# Patient Record
Sex: FEMALE | Race: WHITE
[De-identification: names, ages, dates, MRNs, and addresses within clinical notes are randomized per-mention and may not be internally consistent; named-entity substitution may affect disease eponyms.]

---

## 2018-12-31 ENCOUNTER — HOSPITAL ENCOUNTER (INPATIENT)
Dept: HOSPITAL 10 - E/R | Age: 38
LOS: 4 days | Discharge: HOME | DRG: 282 | End: 2019-01-04
Attending: INTERNAL MEDICINE | Admitting: INTERNAL MEDICINE
Payer: MEDICAID

## 2018-12-31 VITALS
HEIGHT: 62 IN | BODY MASS INDEX: 26.94 KG/M2 | BODY MASS INDEX: 26.94 KG/M2 | WEIGHT: 146.39 LBS | HEIGHT: 62 IN | WEIGHT: 146.39 LBS

## 2018-12-31 DIAGNOSIS — D64.9: ICD-10-CM

## 2018-12-31 DIAGNOSIS — I21.4: Primary | ICD-10-CM

## 2018-12-31 DIAGNOSIS — I95.9: ICD-10-CM

## 2018-12-31 PROCEDURE — 84439 ASSAY OF FREE THYROXINE: CPT

## 2018-12-31 PROCEDURE — 75574 CT ANGIO HRT W/3D IMAGE: CPT

## 2018-12-31 PROCEDURE — 84443 ASSAY THYROID STIM HORMONE: CPT

## 2018-12-31 PROCEDURE — 84100 ASSAY OF PHOSPHORUS: CPT

## 2018-12-31 PROCEDURE — 36415 COLL VENOUS BLD VENIPUNCTURE: CPT

## 2018-12-31 PROCEDURE — 85025 COMPLETE CBC W/AUTO DIFF WBC: CPT

## 2018-12-31 PROCEDURE — 83735 ASSAY OF MAGNESIUM: CPT

## 2018-12-31 PROCEDURE — 84703 CHORIONIC GONADOTROPIN ASSAY: CPT

## 2018-12-31 PROCEDURE — 93306 TTE W/DOPPLER COMPLETE: CPT

## 2018-12-31 PROCEDURE — 82553 CREATINE MB FRACTION: CPT

## 2018-12-31 PROCEDURE — 96375 TX/PRO/DX INJ NEW DRUG ADDON: CPT

## 2018-12-31 PROCEDURE — 84484 ASSAY OF TROPONIN QUANT: CPT

## 2018-12-31 PROCEDURE — 80061 LIPID PANEL: CPT

## 2018-12-31 PROCEDURE — 85610 PROTHROMBIN TIME: CPT

## 2018-12-31 PROCEDURE — 85730 THROMBOPLASTIN TIME PARTIAL: CPT

## 2018-12-31 PROCEDURE — 93005 ELECTROCARDIOGRAM TRACING: CPT

## 2018-12-31 PROCEDURE — 80053 COMPREHEN METABOLIC PANEL: CPT

## 2018-12-31 PROCEDURE — 83036 HEMOGLOBIN GLYCOSYLATED A1C: CPT

## 2018-12-31 PROCEDURE — 84481 FREE ASSAY (FT-3): CPT

## 2018-12-31 PROCEDURE — 80048 BASIC METABOLIC PNL TOTAL CA: CPT

## 2018-12-31 PROCEDURE — 71045 X-RAY EXAM CHEST 1 VIEW: CPT

## 2018-12-31 PROCEDURE — 96374 THER/PROPH/DIAG INJ IV PUSH: CPT

## 2018-12-31 PROCEDURE — 82550 ASSAY OF CK (CPK): CPT

## 2018-12-31 NOTE — ERD
ER Documentation


Chief Complaint


Chief Complaint





Left trapezius pain, left jaw pain onset today





HPI


This is a 38-year-old female complaining of pain to her left trapezius and 


periscapular region that is worse with movement of the left arm and left 


shoulder.  She said the onset of this pain was last night.  Denies any injury or


trauma denies any recent lifting pushing pulling.  She said she had brief left 


anterior chest pain this morning x1 but no shortness of breath.  She also 


complains of pain to the left TMJ region that she says hurts when she pushes on 


it or opens her mouth.  No trauma to this area either.  No dizziness shortness 


of breath diaphoresis





ROS


All systems reviewed and are negative except as per history of present illness.





Allergies


Allergies:  


Coded Allergies:  


     No Known Allergy (Unverified , 12/31/18)





PMhx/Soc


Medical and Surgical Hx:  pt denies Medical Hx, pt denies Surgical Hx


Hx Alcohol Use:  No


Hx Substance Use:  No


Hx Tobacco Use:  No


Smoking Status:  Never smoker





FmHx


Family History:  No coronary disease





Physical Exam


Vitals





Vital Signs


  Date      Temp  Pulse  Resp  B/P (MAP)   Pulse Ox  O2          O2 Flow    FiO2


Time                                                 Delivery    Rate


  12/31/18  98.6     70    16      114/76        99  Room Air


     20:34                           (89)


  12/31/18  98.6     68    18      129/82       100


     20:25                           (98)





Physical Exam


 Const:      Well-developed, well-nourished


 Head:        Atraumatic, normocephalic


 Eyes:       Normal Conjunctiva, PERRLA, EOMI, normal sclera, no nystagmus


 ENT:         Normal External Ears, Nose and Mouth, moist mucus membranes, 


tenderness to the left TMJ, pain is worse when opening her mouth and 


reproducible when opening her mouth.


 Neck:        Full range of motion.  No meningismus, no lymphadenopathy, there 


is reproducible tenderness to the left trapezius muscle and rhomboids, the pain 


is reproducible with movement of the left arm and left shoulder.


 Resp:         Clear to auscultation bilaterally, no wheezing, rhonchi, rales


 Cardio:       Regular rate and rhythm, no murmurs, S1 S2 present


 Abd:         Soft, non tender x 4, non distended. Normal bowel sounds, no 


guarding or rebound, no pulsitile abdominal masses or bruits


 Skin:         No petechiae or rashes, no ecchymosis , no maculopapular rash


 Back:        No midline or flank tenderness


 Ext:          No cyanosis, or edema, FROM x 4, normal inspection, 


neurovascularly intact x 4


 Neur:        Awake and alert, STR 5/5 x 4, sensation intact x 4, no focal 


findings, cerebellum intact


 Psych:        Normal Mood and Affect


Results 24 hrs





Laboratory Tests


                           Test
       12/31/18
21:29


                           Troponin I   12.700 ng/ml





Current Medications


 Medications
   Dose
          Sig/Betty
       Start Time
   Status  Last


 (Trade)       Ordered        Route
 PRN     Stop Time              Admin
Dose


                              Reason                                Admin


 Aspirin
       162 mg         ONCE  STAT
    12/31/18      DC          12/31/18


(Aspirin)                     PO
            21:01
                       21:21



                                             12/31/18


                                             21:02


                1 mg           ONCE  STAT
    12/31/18      DC          12/31/18


Hydromorphone                 IV
            21:01
                       21:31



HCl
                                         12/31/18


(Dilaudid)                                   21:02


 Ondansetron    4 mg           ONCE  STAT
    12/31/18      DC          12/31/18


HCl
  (Zofran                 IV
            21:01
                       21:31



Inj)                                         12/31/18


                                             21:02


                125 mg         ONCE  ONCE
    12/31/18      DC          12/31/18


Methylprednis                 IV
            21:30
                       21:31



olone
 Sodium                                12/31/18


Succinate
                                   21:31


(Solu-Medrol)


 Aspirin
       162 mg         ONCE  STAT
    12/31/18      DC       



(Aspirin)                     PO
            22:15



                                             12/31/18


                                             22:18


                1 inch         ONCE  STAT
    12/31/18      DC       



Nitroglycerin                 TD
            22:15




                                            12/31/18


(Nitroglyceri                                22:18


n
 2% Oint)


 Enoxaparin     70 mg          ONCE  STAT
    12/31/18      DC       



Sodium
                       SC
            22:15



(Lovenox)                                    12/31/18


                                             22:18








Procedures/MDM


EKG: 


Rate/Rhythm:             Normal sinus rhythm, Q waves in lead V1 and V2


QRS, ST, QT:    NORMAL OK, QRS, QT]


Impression:      Abnormal EKG











Patient's troponin is 12.  She is a very atypical presentation for non-STEMI.  


She has no risk factors for cardiovascular disease.





I ordered blood work, chest x-ray.  Will administer another 162 of aspirin with 


Nitropaste and Lovenox.





Will admit to panel.





Critical Care Time:     30 minutes





Treatments/Evaluations: Close monitoring and treatment of unstable vital signs, 


cardiorespiratory, and neurologic status, while maintaining tight balance of 


fluid, respiratory, and cardiac interventions. This time includes discussing the


case with the patient and the patient's family. This time does not include all 


procedures stated elsewhere in this record. This time also includes reviewing 


old records, labs and radiological studies. This time includes examining and re-


examining the patient. Additionally, this time also includes arranging care with


admitting and consulting physicians.





Departure


Diagnosis:  


   Primary Impression:  


   Non-STEMI (non-ST elevated myocardial infarction)


Condition:  Stable











GEMA FINNEY DO         Dec 31, 2018 22:10

## 2018-12-31 NOTE — HP
Date/Time of Note


Date/Time of Note


DATE: 12/31/18 


TIME: 23:55





Assessment/Plan


VTE Prophylaxis


Pharmacological prophylaxis:  heparin





Lines/Catheters


IV Catheter Type (from Nrs):  Saline Lock





Assessment/Plan


Assessment/Plan





38-year-old female with no significant past medical history being admitted for 


NSTEMI





PLAN


Admit to telemetry unit


Patient is status post therapeutic dose of Lovenox and aspirin in the ER.  We 


will continue. 


Add statin and if blood pressure allows beta-blocker.  As needed nitro


Supplemental oxygen


Trend troponin


2D echo and cardiology consult


Result Diagram:  


12/31/18 2126 12/31/18 2126





Results 24hrs





Laboratory Tests


      Test
                                12/31/18
21:26  12/31/18
21:29


      White Blood Count                             9.8


      Red Blood Count                             3.95  L


      Hemoglobin                                  11.7  L


      Hematocrit                                  34.0  L


      Mean Corpuscular Volume                      86.1


      Mean Corpuscular Hemoglobin                  29.6


      Mean Corpuscular Hemoglobin
Concent         34.4  
  



      Red Cell Distribution Width                  12.7


      Platelet Count                                228


      Mean Platelet Volume                        11.8  H


      Immature Granulocytes %                     0.400


      Neutrophils %                               80.8  H


      Lymphocytes %                               10.4  L


      Monocytes %                                   7.4


      Eosinophils %                                 0.7


      Basophils %                                   0.3


      Nucleated Red Blood Cells %                   0.0


      Immature Granulocytes #                    0.040  H


      Neutrophils #                                7.9  H


      Lymphocytes #                                 1.0


      Monocytes #                                   0.7


      Eosinophils #                                 0.1


      Basophils #                                   0.0


      Nucleated Red Blood Cells #                   0.0


      Prothrombin Time                             14.0


      Prothrombin Time Ratio                        1.1


      INR International Normalized
Ratio          1.07  
  



      Activated Partial
Thromboplast Time         28.6  
  



      Sodium Level                                  138


      Potassium Level                              3.1  L


      Chloride Level                                101


      Carbon Dioxide Level                           24


      Anion Gap                                      13


      Blood Urea Nitrogen                            14


      Creatinine                                   0.48


      Est Glomerular Filtrat Rate
mL/min   > 60  
         



      Glucose Level                                 167


      Calcium Level                                 8.8


      Total Bilirubin                               0.2


      Direct Bilirubin                             0.00


      Indirect Bilirubin                            0.2


      Aspartate Amino Transf
(AST/SGOT)           105  H
  



      Alanine Aminotransferase
(ALT/SGPT)          75  H
  



      Alkaline Phosphatase                          107


      Total Protein                                 6.1


      Albumin                                       3.4


      Globulin                                     2.70


      Albumin/Globulin Ratio                       1.25


      Troponin I                                               12.700  *H








HPI/ROS


Admit Date/Time


Admit Date/Time








Hx of Present Illness





This is a 38-year-old female with no significant past medical history who 


presents the ER complaining of chest pain, left shoulder and left arm pain.  


Chest pain is mainly left-sided, which started in the morning and has resolved 


already.  Throughout the day, she continues to have left shoulder/left upper 


back as well as left arm pain.  Denied shortness of breath, nausea/vomiting or 


diaphoresis.  He also denied trauma or strenuous activities.


When she presented to ER, initial troponin was found to be 12.  EKG shows Q 


waves as well as T wave abnormalities, but no ST elevation or depression.  


Patient was given treatment dose of Lovenox.  Vital stable and the chest x-ray 


without acute findings.


.





PMH/Family/Social


Past Medical History


Medications





Current Medications


Ondansetron HCl (Zofran Inj) 4 mg ER BRIDGE PRN IV NAUSEA AND/OR VOMITING;  


Start 12/31/18 at 22:30;  Stop 1/1/19 at 22:29


Acetaminophen (Tylenol Tab) 650 mg ER BRIDGE PRN PO MILD PAIN(1-3)OR ELEVATED 


TEMP;  Start 12/31/18 at 22:30;  Stop 1/1/19 at 22:29


Coded Allergies:  


     No Known Allergy (Unverified , 12/31/18)





Social History


Smoking Status:  Never smoker





Exam/Review of Systems


Vital Signs


Vitals





Vital Signs


  Date      Temp  Pulse  Resp  B/P (MAP)   Pulse Ox  O2          O2 Flow    FiO2


Time                                                 Delivery    Rate


  12/31/18           66    13      113/76        99  Nasal             2.0


     22:57                           (88)            Cannula


  12/31/18  98.6


     20:34








Exam


Exam


Constitutional:  other (no acute distress)


Head:  normocephalic


Respiratory:  other (slight decreased at bases)


Cardiovascular:  regular rate and rhythm


Gastrointestinal:  soft


Extremities:  normal pulses





PMH/Family/Social


Past Medical History


Medical History:  other (see hpi)


Coded Allergies:  


     No Known Drug Allergy (Verified  Allergy, Unknown, 8/18/16)





Past Surgical History


Past Surgical Hx:  other (see hpi)





Family History


Significant Family History:  no pertinent family hx





Social History


Alcohol Use:  other


Smoking Status:  Unknown if ever smoked


Drug Use:  other











ANAT MABRY MD                 Dec 31, 2018 23:55

## 2019-01-01 VITALS — SYSTOLIC BLOOD PRESSURE: 101 MMHG | RESPIRATION RATE: 18 BRPM | DIASTOLIC BLOOD PRESSURE: 57 MMHG | HEART RATE: 63 BPM

## 2019-01-01 VITALS — HEART RATE: 63 BPM | RESPIRATION RATE: 16 BRPM | DIASTOLIC BLOOD PRESSURE: 58 MMHG | SYSTOLIC BLOOD PRESSURE: 98 MMHG

## 2019-01-01 VITALS — SYSTOLIC BLOOD PRESSURE: 102 MMHG | HEART RATE: 90 BPM | RESPIRATION RATE: 17 BRPM | DIASTOLIC BLOOD PRESSURE: 55 MMHG

## 2019-01-01 VITALS — RESPIRATION RATE: 18 BRPM | HEART RATE: 58 BPM | SYSTOLIC BLOOD PRESSURE: 97 MMHG | DIASTOLIC BLOOD PRESSURE: 60 MMHG

## 2019-01-01 VITALS — HEART RATE: 62 BPM

## 2019-01-01 VITALS — HEART RATE: 87 BPM | RESPIRATION RATE: 18 BRPM | SYSTOLIC BLOOD PRESSURE: 96 MMHG | DIASTOLIC BLOOD PRESSURE: 62 MMHG

## 2019-01-01 VITALS — HEART RATE: 65 BPM

## 2019-01-01 VITALS — HEART RATE: 69 BPM | DIASTOLIC BLOOD PRESSURE: 57 MMHG | RESPIRATION RATE: 17 BRPM | SYSTOLIC BLOOD PRESSURE: 99 MMHG

## 2019-01-01 VITALS — HEART RATE: 69 BPM

## 2019-01-01 VITALS — HEART RATE: 75 BPM | DIASTOLIC BLOOD PRESSURE: 51 MMHG | RESPIRATION RATE: 17 BRPM | SYSTOLIC BLOOD PRESSURE: 95 MMHG

## 2019-01-01 VITALS — HEART RATE: 82 BPM

## 2019-01-01 RX ADMIN — FOLIC ACID SCH MLS/HR: 5 INJECTION, SOLUTION INTRAMUSCULAR; INTRAVENOUS; SUBCUTANEOUS at 09:29

## 2019-01-01 RX ADMIN — ENOXAPARIN SODIUM SCH MG: 100 INJECTION SUBCUTANEOUS at 09:13

## 2019-01-01 RX ADMIN — ENOXAPARIN SODIUM SCH MG: 100 INJECTION SUBCUTANEOUS at 20:16

## 2019-01-01 RX ADMIN — FOLIC ACID SCH MLS/HR: 5 INJECTION, SOLUTION INTRAMUSCULAR; INTRAVENOUS; SUBCUTANEOUS at 01:50

## 2019-01-01 RX ADMIN — ATORVASTATIN CALCIUM SCH MG: 80 TABLET, FILM COATED ORAL at 20:10

## 2019-01-01 RX ADMIN — FOLIC ACID SCH MLS/HR: 5 INJECTION, SOLUTION INTRAMUSCULAR; INTRAVENOUS; SUBCUTANEOUS at 12:55

## 2019-01-01 RX ADMIN — ASPIRIN 81 MG SCH MG: 81 TABLET ORAL at 09:12

## 2019-01-01 NOTE — NUR
END OF SHIFT REPORT: No complaints of chest pain all day. VSS. Patient updated with plan of 
care by Dr. Puentes and is agreeable. NPO except meds after midnight for possible procedure 
in AM.

## 2019-01-01 NOTE — NUR
NOTES

PT DENIES ANY PAIN DURING THE SHIFT.

INSTRUCTED TO CALL THE NURSE IF SHE FEEL ANY DISCOMFORT.

## 2019-01-01 NOTE — CONS
Date/Time of Note


Date/Time of Note


DATE: 1/1/19 


TIME: 13:10





Assessment/Plan


Non-ST elevation myocardial infarction


Preserved ejection fraction





-Patient with initial symptoms of fevers and chills and cough and then developed


left-sided chest pain and arm pain.  Initial troponin was 12 and has since been 


trending down.  She denies any shortness of breath or chest pain at the current 


time.  Echocardiogram with difficult acoustic windows but overall preserved 


ejection fraction, ECG with anterior Q waves.  Continue aspirin therapy, statin 


therapy, anticoagulation, will likely need coronary angiogram.  Plan of care and


findings discussed with the patient and  at bedside via .





-Dr Suárez to resume care 1/2/2019


Result Diagram:  


1/1/19 0549                                                                     


          1/1/19 0549





Results 24hrs





Laboratory Tests


Test
                 12/31/18
21:26  12/31/18
21:29  1/1/19
00:43  1/1/19
05:49


White Blood Count              9.8                                         8.1


Red Blood Count              3.95  L                                     4.14  L


Hemoglobin                   11.7  L                                      12.4


Hematocrit                   34.0  L                                     35.6  L


Mean Corpuscular              86.1                                        86.0


Volume


Mean Corpuscular              29.6                                        30.0


Hemoglobin


Mean Corpuscular             34.4  
  
               
                  34.8  



Hemoglobin
Concent


Red Cell                      12.7                                        12.6


Distribution Width


Platelet Count                 228                                         263


Mean Platelet Volume         11.8  H                                     11.4  H


Immature                     0.400                                      0.600  H


Granulocytes %


Neutrophils %                80.8  H


Lymphocytes %                10.4  L


Monocytes %                    7.4


Eosinophils %                  0.7


Basophils %                    0.3


Nucleated Red Blood            0.0                                         0.0


Cells %


Immature                    0.040  H                                    0.050  H


Granulocytes #


Neutrophils #                 7.9  H


Lymphocytes #                  1.0


Monocytes #                    0.7


Eosinophils #                  0.1


Basophils #                    0.0


Nucleated Red Blood            0.0


Cells #


Prothrombin Time              14.0


Prothrombin Time               1.1


Ratio


INR International            1.07  
  
               
             



Normalized
Ratio


Activated                    28.6  
  
               
             



Partial
Thromboplast


Time


Sodium Level                   138                                         140


Potassium Level               3.1  L                                       4.0


Chloride Level                 101                                         102


Carbon Dioxide Level            24                                          26


Anion Gap                       13                                          12


Blood Urea Nitrogen             14                                           9


Creatinine                    0.48                                       0.41  L


Est Glomerular        > 60  
         
               
             > 60  



Filtrat Rate
mL/min


Glucose Level                  167                                         194


Calcium Level                  8.8                                         9.3


Total Bilirubin                0.2                                         0.3


Direct Bilirubin              0.00                                        0.00


Indirect Bilirubin             0.2                                         0.3


Aspartate Amino              105  H
  
               
                  106  H



Transf
(AST/SGOT)


Alanine                       75  H
  
               
                    68  



Aminotransferase
(AL


T/SGPT)


Alkaline Phosphatase           107                                         107


Total Protein                  6.1                                        7.1  #


Albumin                        3.4                                         3.9


Globulin                      2.70                                        3.20


Albumin/Globulin              1.25                                        1.21


Ratio


Troponin I                                12.700  *H    10.300  *H     5.960  *H


Creatine Kinase                                             735  H        511  H


Creatine Kinase                                              8.7           9.6


Index


Creatinine Kinase MB                                      64.00  H      49.10  H


(Mass)


Segmented             
               
               
                   90  H



Neutrophils


%
(Manual)


Band Neutrophils %                                                           1


(Manual)


Lymphocytes %                                                               4  L


(Manual)


Reactive Lymphocytes  
               
               
                    3  H



%
(Manual)


Monocytes % (Manual)                                                         1


Plasma Cells %                                                               1


(manual)


Neutrophils #                                                              7.3


(Manual)


Band Neutrophils #                                                         0.0


Lymphocytes (Manual)                                                      0.3  L


Reactive Lymphocytes                                                      0.2  H


#


Monocytes # (Manual)                                                      0.0  L


Plasma Cells #                                                             0.0


(manual)


Platelet Estimate                                                   NORMAL


Giant Platelets                                                             2  H


Platelet Morphology                                                 @See below


Comment


Anisocytosis                                                                1+


Hemoglobin A1c                                                             5.5


Magnesium Level                                                            2.1


Triglycerides Level                                                         81


Cholesterol Level                                                          142


LDL Cholesterol,                                                            80


Calculated


HDL Cholesterol                                                             46


Cholesterol/HDL                                                            3.0


Ratio


Thyroid Stimulating   
               
               
                0.113  L



Hormone
(TSH)








Consultation Date/Type/Reason


Admit Date/Time





Type of Consult


cv


Reason for Consultation


Elevated troponin





Hx of Present Illness


This is a 38-year-old female with no significant past medical history was having


symptoms of fevers, chills and cough proxy 2-3 days, then approximately 2-3 days


ago, developed left-sided chest pain and arm pain.  Patient could not describe 


the pain that she thought it felt musculoskeletal.  She denies any chest 


pressure.  Denies any shortness of breath.  Symptoms were getting severe and 


slowly improved.  Yesterday, patient with continued chest and arm pain because 


of the above, came to the emergency room for evaluation and care.  She denies 


any further chest pain, arm pain since yesterday evening.  She denies any 


shortness of breath.  She complains of mild headache after nitroglycerin.


12 point review of systems was performed with all pertinent positives and 


negatives mentioned above and all else is negative





Past Medical History


Medical History:  no pertinent history


Medications





Current Medications


Dextrose/Sodium Chloride 1,000 ml @  100 mls/hr Q10H IV  Last administered on 


1/1/19at 12:55; Admin Dose 100 MLS/HR;  Start 1/1/19 at 00:00


IV Flush (NS 3 ml) 3 ml PER PROTOCOL IV ;  Start 1/1/19 at 00:00


Ondansetron HCl (Zofran Inj) 4 mg Q6H  PRN IV NAUSEA AND/OR VOMITING;  Start 


1/1/19 at 00:00


Aspirin (Aspirin) 81 mg DAILY PO  Last administered on 1/1/19at 09:12; Admin 


Dose 81 MG;  Start 1/1/19 at 09:00


Nitroglycerin (Nitroglycerin (Sl Tab) 0.4 Mg) 1 tab Q5M  PRN SL CHEST PAIN;  


Start 1/1/19 at 00:00


Acetaminophen (Tylenol Tab) 650 mg Q6H  PRN PO PAIN LEVEL 1-3 OR FEVER;  Start 


1/1/19 at 00:00


Albuterol/ Ipratropium (Duoneb) 3 ml Q2H RESP THERAPY  PRN HHN SHORTNESS OF 


BREATH;  Start 1/1/19 at 00:00


Enoxaparin Sodium (Lovenox) 65 mg Q12 SC  Last administered on 1/1/19at 09:13; 


Admin Dose 65 MG;  Start 1/1/19 at 09:00


Atorvastatin Calcium (Lipitor) 80 mg HS PO ;  Start 1/1/19 at 21:00


Morphine Sulfate (morphine) 2 mg Q4H  PRN IV SEVERE PAIN LEVEL 7-10;  Start 


1/1/19 at 11:30


Allergies:  


Coded Allergies:  


     No Known Allergy (Unverified , 12/31/18)





Past Surgical History


Past Surgical Hx:  no surgical history





Family History


Significant Family History:  no pertinent family hx





Social History


Alcohol Use:  none


Smoking Status:  Former smoker


Other Social History


Works as a cook in a restaurant





Exam/Review of Systems


Vital Signs


Vitals





Vital Signs


  Date      Temp  Pulse  Resp  B/P (MAP)   Pulse Ox  O2          O2 Flow    FiO2


Time                                                 Delivery    Rate


    1/1/19           65


     13:07


    1/1/19  98.2           17  99/57 (71)        97  Nasal


     11:37                                           Cannula


    1/1/19                                                             2.0


     04:00








Intake and Output





12/31/18 12/31/18 1/1/19





1515:00


23:00


07:00





IntakeIntake Total


620 ml





BalanceBalance


620 ml














Exam


No apparent distress


Constitutional:  alert, oriented, well developed


Head:  normocephalic


Respiratory:  clear to auscultation, normal air movement


Cardiovascular:  regular rate and rhythm, other (S1-S2 heard)


Gastrointestinal:  soft, non-tender, bowel sounds


Extremities:  other (No significant edema)





Medications


Medications





Current Medications


Dextrose/Sodium Chloride 1,000 ml @  100 mls/hr Q10H IV  Last administered on 


1/1/19at 12:55; Admin Dose 100 MLS/HR;  Start 1/1/19 at 00:00


IV Flush (NS 3 ml) 3 ml PER PROTOCOL IV ;  Start 1/1/19 at 00:00


Ondansetron HCl (Zofran Inj) 4 mg Q6H  PRN IV NAUSEA AND/OR VOMITING;  Start 


1/1/19 at 00:00


Aspirin (Aspirin) 81 mg DAILY PO  Last administered on 1/1/19at 09:12; Admin Do


se 81 MG;  Start 1/1/19 at 09:00


Nitroglycerin (Nitroglycerin (Sl Tab) 0.4 Mg) 1 tab Q5M  PRN SL CHEST PAIN;  


Start 1/1/19 at 00:00


Acetaminophen (Tylenol Tab) 650 mg Q6H  PRN PO PAIN LEVEL 1-3 OR FEVER;  Start 


1/1/19 at 00:00


Albuterol/ Ipratropium (Duoneb) 3 ml Q2H RESP THERAPY  PRN HHN SHORTNESS OF 


BREATH;  Start 1/1/19 at 00:00


Enoxaparin Sodium (Lovenox) 65 mg Q12 SC  Last administered on 1/1/19at 09:13; 


Admin Dose 65 MG;  Start 1/1/19 at 09:00


Atorvastatin Calcium (Lipitor) 80 mg HS PO ;  Start 1/1/19 at 21:00


Morphine Sulfate (morphine) 2 mg Q4H  PRN IV SEVERE PAIN LEVEL 7-10;  Start 1/ 1/19 at 11:30





Imaging


Imaging


ECG with sinus rhythm, anterior Q waves, nonspecific ST abnormalities











Edwin Puentes DO            Jan 1, 2019 13:15

## 2019-01-01 NOTE — NUR
NOTES

RESTING ON BED WITH HEPLOCK INTACT.

INSTRUCTED TO KEEP NPO FROM MIDNIGHT FOR POSSIBLE PROCEDURE IN AM.

## 2019-01-01 NOTE — RADRPT
Echocardiogram Report

 

Patient Name:  KELVIN BRUCE  Gender:       Female

MRN:           [id number]                    Accession #:  [id number]

Birth Date:    1980                Study Date:   01-Jan-2019

Sonographer:   Jean PEREZ RDCS            Location:     [facility]

 

Ref. Physician: ANAT MABRY

Quality: Adequate

 

Procedures: Transthoracic echocardiogram with complete 2D, M-Mode, and 

doppler examination.

Indications: NSTEMI.

 

2D/M Mode                          Doppler

Measurement  Value  Normal Ranges  Measurement    Value  Normal Ranges

LVIDd 2D     4.0    3.5 - 5.6 cm   AV [place]    1.3    m/sec

LVIDs 2D     2.9    2.1 - 4.1 cm   AV Peak [facility]     7.0    mmHg

FS 2D        28.1   %              LVOT [place]  0.8    m/sec

LVPWd 2D     1.0    0.6 - 1.1 cm   LVOT [facility]   2.0    mmHg

IVSd 2D      0.9    0.6 - 1.1 cm   MV E [place]  0.9    m/sec

IVS/LVPW 2D  0.9                   MV A [place]  0.9    m/sec

AoR Diam 2D  2.3    2.0 - 3.7 cm   MV E/A         1.0

LA/Ao 2D     1      0 - 1          MV Decel Time  169    msec

EDV 2D       65.0   cm3            MV E/A         1.0

ESV 2D       24.1   cm3            TV E [place]  0.7    m/sec

LA Dimen 2D  3.0    2.3 - 4.0 cm

 

Findings

Left Ventricle: Overall, normal left ventricular systolic function. Not 

all segments visualized.  Normal left ventricular cavity size.  Normal 

left ventricular wall thickness.  Ejection fraction is visually 

estimated at 60 %.

Right Ventricle: Normal right ventricular size.  Normal right 

ventricular systolic function.

Left Atrium: The left atrium is normal in size.

Right Atrium: The right atrium is normal in size.

Mitral Valve: Normal appearance of the mitral valve.  No mitral valve 

regurgitation is seen.

Aortic Valve: Normal appearance of the aortic valve.  No significant 

aortic stenosis or insufficiency.

Tricuspid Valve: Normal appearance of the tricuspid valve.  There is 

trace tricuspid regurgitation.

Pulmonic Valve: Normal pulmonic valve appearance.  No evidence of 

pulmonic regurgitation.

Pericardium: Normal pericardium with no significant pericardial effusion.

Aorta: Normal aortic root.

IVC: Normal size and normal respiratory collapse consistent with normal 

right atrial pressure.

 

Conclusions

Overall, normal left ventricular systolic function. Not all segments 

visualized.  Normal left ventricular cavity size.  Normal left 

ventricular wall thickness.  Ejection fraction is visually estimated at 

60 %.

Normal right ventricular size.  Normal right ventricular systolic 

function.

The left atrium is normal in size.

The right atrium is normal in size.

No significant valvular stenosis or regurgitation seen.

Normal pericardium with no significant pericardial effusion.

 

Electronically Signed By:

Edwin Puentes

01-Jan-2019 12:11:19  -0800

 

Patient Name: KELVIN BRUCE

MRN: [id number]

Study Date: 01-Jan-2019 [id number]

## 2019-01-01 NOTE — NUR
NOTES

ADMISSION UNDER THE CARE OF DR MABRY. ADMISSION ORDER NOTED AND CARRIED OUT.

INITIAL ASSESSMENT DONE.

## 2019-01-01 NOTE — PN
Date/Time of Note


Date/Time of Note


DATE: 1/1/19 


TIME: 11:23





Assessment/Plan


VTE Prophylaxis


Risk score (from Ns)>0 risk:  2


SCD applied (from Nsg):  Yes


Pharmacological prophylaxis:  LMWH





Lines/Catheters


IV Catheter Type (from Nrsg):  Peripheral IV


Urinary Cath still in place:  No





Assessment/Plan


Hospital Course





SUBJECTIVE: Lying in bed comfortably.  Denies any chest pain, palpitation, 


shortness of breath or other discomfort at this time.








OBJECTIVE: Vital signs-see below








PHYSICAL EXAM:


Constitutional: Well-developed, adequately built, lying in bed comfortably.


Psych:  nl mood/affect, no complaints


Head:  atraumatic, normocephalic


Eyes:  nl conjunctiva, nl sclera


ENMT:  mucosa pink and moist, nl external ears & nose


Neck:  non-tender, supple


Respiratory:  clear to auscultation, normal air movement


Cardiovascular:  nl pulses, regular rate and rhythm


Gastrointestinal:  non-tender, soft, bowel sounds active in all 4 quadrants.


Musculoskeletal/extremities:  nl extremities to inspection, motor strength equal


bilaterally, no focal deficit. Normal pulses,no cyanosis, no edema.


Neurological: Alert oriented 3,nl speech, nl strength


Skin:  nl turgor





ASSESSMENT/PLAN:38-year-old female with no past medical or social history, 


admitted with 2-day duration of left-sided chest pain radiating to left 


shoulder, neck, and LUE, found to have elevated troponin with EKG changes.





1.  Non-ST elevated myocardial infarction.


-Pending Cardiology consult.  As per sarah, left message to Dr. Suárez.  I 


spoke with Dr. Suárez and we will consult  who is on call today.


-Continue aspirin, full dose anticoagulation, increase statin to high intensity.


-PRN nitroglycerin, morphine, oxygen if indicated.


-Follow-up 2D echocardiogram


-Initial EKG with presence of Q waves.  I will repeat her twelve-lead EKG now.  





DVT prophylaxis: Full dose Lovenox


PUD prophylaxis: Not indicated


Diet: Recommend keeping patient n.p.o. for possible cardiac intervention today.





Disposition: Continue current medical management and follow-up cardiology 


recommendations.





Patient was seen in collaboration with .


Result Diagram:  


1/1/19 0549                                                                     


          1/1/19 0549





Results 24hrs





Laboratory Tests


Test
                 12/31/18
21:26  12/31/18
21:29  1/1/19
00:43  1/1/19
05:49


White Blood Count              9.8                                         8.1


Red Blood Count              3.95  L                                     4.14  L


Hemoglobin                   11.7  L                                      12.4


Hematocrit                   34.0  L                                     35.6  L


Mean Corpuscular              86.1                                        86.0


Volume


Mean Corpuscular              29.6                                        30.0


Hemoglobin


Mean Corpuscular             34.4  
  
               
                  34.8  



Hemoglobin
Concent


Red Cell                      12.7                                        12.6


Distribution Width


Platelet Count                 228                                         263


Mean Platelet Volume         11.8  H                                     11.4  H


Immature                     0.400                                      0.600  H


Granulocytes %


Neutrophils %                80.8  H


Lymphocytes %                10.4  L


Monocytes %                    7.4


Eosinophils %                  0.7


Basophils %                    0.3


Nucleated Red Blood            0.0                                         0.0


Cells %


Immature                    0.040  H                                    0.050  H


Granulocytes #


Neutrophils #                 7.9  H


Lymphocytes #                  1.0


Monocytes #                    0.7


Eosinophils #                  0.1


Basophils #                    0.0


Nucleated Red Blood            0.0


Cells #


Prothrombin Time              14.0


Prothrombin Time               1.1


Ratio


INR International            1.07  
  
               
             



Normalized
Ratio


Activated                    28.6  
  
               
             



Partial
Thromboplast


Time


Sodium Level                   138                                         140


Potassium Level               3.1  L                                       4.0


Chloride Level                 101                                         102


Carbon Dioxide Level            24                                          26


Anion Gap                       13                                          12


Blood Urea Nitrogen             14                                           9


Creatinine                    0.48                                       0.41  L


Est Glomerular        > 60  
         
               
             > 60  



Filtrat Rate
mL/min


Glucose Level                  167                                         194


Calcium Level                  8.8                                         9.3


Total Bilirubin                0.2                                         0.3


Direct Bilirubin              0.00                                        0.00


Indirect Bilirubin             0.2                                         0.3


Aspartate Amino              105  H
  
               
                  106  H



Transf
(AST/SGOT)


Alanine                       75  H
  
               
                    68  



Aminotransferase
(AL


T/SGPT)


Alkaline Phosphatase           107                                         107


Total Protein                  6.1                                        7.1  #


Albumin                        3.4                                         3.9


Globulin                      2.70                                        3.20


Albumin/Globulin              1.25                                        1.21


Ratio


Troponin I                                12.700  *H    10.300  *H     5.960  *H


Creatine Kinase                                             735  H        511  H


Creatine Kinase                                              8.7           9.6


Index


Creatinine Kinase MB                                      64.00  H      49.10  H


(Mass)


Segmented             
               
               
                   90  H



Neutrophils


%
(Manual)


Band Neutrophils %                                                           1


(Manual)


Lymphocytes %                                                               4  L


(Manual)


Reactive Lymphocytes  
               
               
                    3  H



%
(Manual)


Monocytes % (Manual)                                                         1


Plasma Cells %                                                               1


(manual)


Neutrophils #                                                              7.3


(Manual)


Band Neutrophils #                                                         0.0


Lymphocytes (Manual)                                                      0.3  L


Reactive Lymphocytes                                                      0.2  H


#


Monocytes # (Manual)                                                      0.0  L


Plasma Cells #                                                             0.0


(manual)


Platelet Estimate                                                   NORMAL


Giant Platelets                                                             2  H


Platelet Morphology                                                 @See below


Comment


Anisocytosis                                                                1+


Hemoglobin A1c                                                             5.5


Magnesium Level                                                            2.1


Triglycerides Level                                                         81


Cholesterol Level                                                          142


LDL Cholesterol,                                                            80


Calculated


HDL Cholesterol                                                             46


Cholesterol/HDL                                                            3.0


Ratio


Thyroid Stimulating   
               
               
                0.113  L



Hormone
(TSH)








Exam/Review of Systems


Vital Signs


Vitals





Vital Signs


  Date      Temp  Pulse  Resp  B/P (MAP)   Pulse Ox  O2          O2 Flow    FiO2


Time                                                 Delivery    Rate


    1/1/19           62


     09:12


    1/1/19  98.0           16  98/58 (71)        99  Room Air


     07:21


    1/1/19                                                             2.0


     04:00








Intake and Output





12/31/18 12/31/18 1/1/19





1515:00


23:00


07:00





IntakeIntake Total


620 ml





BalanceBalance


620 ml














Medications


Medications





Current Medications


Ondansetron HCl (Zofran Inj) 4 mg ER BRIDGE PRN IV NAUSEA AND/OR VOMITING;  


Start 12/31/18 at 22:30;  Stop 1/1/19 at 22:29


Acetaminophen (Tylenol Tab) 650 mg ER BRIDGE PRN PO MILD PAIN(1-3)OR ELEVATED 


TEMP;  Start 12/31/18 at 22:30;  Stop 1/1/19 at 22:29


Dextrose/Sodium Chloride 1,000 ml @  100 mls/hr Q10H IV  Last administered on 


1/1/19at 01:50; Admin Dose 100 MLS/HR;  Start 1/1/19 at 00:00


IV Flush (NS 3 ml) 3 ml PER PROTOCOL IV ;  Start 1/1/19 at 00:00


Ondansetron HCl (Zofran Inj) 4 mg Q6H  PRN IV NAUSEA AND/OR VOMITING;  Start 


1/1/19 at 00:00


Aspirin (Aspirin) 81 mg DAILY PO  Last administered on 1/1/19at 09:12; Admin 


Dose 81 MG;  Start 1/1/19 at 09:00


Nitroglycerin (Nitroglycerin (Sl Tab) 0.4 Mg) 1 tab Q5M  PRN SL CHEST PAIN;  


Start 1/1/19 at 00:00


Acetaminophen (Tylenol Tab) 650 mg Q6H  PRN PO PAIN LEVEL 1-3 OR FEVER;  Start 


1/1/19 at 00:00


Acetaminophen/ Hydrocodone Bitart (Norco (5/325)) 1 tab Q6H  PRN PO PAIN LEVEL 


4-6;  Start 1/1/19 at 00:00


Acetaminophen/ Hydrocodone Bitart (Norco (5/325)) 2 tab Q6H  PRN PO PAIN LEVEL 


7-10;  Start 1/1/19 at 00:00


Albuterol/ Ipratropium (Duoneb) 3 ml Q2H RESP THERAPY  PRN HHN SHORTNESS OF BR


EATH;  Start 1/1/19 at 00:00


Enoxaparin Sodium (Lovenox) 65 mg Q12 SC  Last administered on 1/1/19at 09:13; 


Admin Dose 65 MG;  Start 1/1/19 at 09:00


Atorvastatin Calcium (Lipitor) 20 mg HS PO ;  Start 1/1/19 at 21:00











KENNA ROMO NP                Jan 1, 2019 11:24

## 2019-01-02 VITALS — RESPIRATION RATE: 16 BRPM | DIASTOLIC BLOOD PRESSURE: 54 MMHG | SYSTOLIC BLOOD PRESSURE: 97 MMHG | HEART RATE: 76 BPM

## 2019-01-02 VITALS — DIASTOLIC BLOOD PRESSURE: 62 MMHG | HEART RATE: 73 BPM | RESPIRATION RATE: 18 BRPM | SYSTOLIC BLOOD PRESSURE: 103 MMHG

## 2019-01-02 VITALS — HEART RATE: 69 BPM | RESPIRATION RATE: 19 BRPM | SYSTOLIC BLOOD PRESSURE: 105 MMHG | DIASTOLIC BLOOD PRESSURE: 57 MMHG

## 2019-01-02 VITALS — SYSTOLIC BLOOD PRESSURE: 102 MMHG | RESPIRATION RATE: 18 BRPM | HEART RATE: 69 BPM | DIASTOLIC BLOOD PRESSURE: 61 MMHG

## 2019-01-02 VITALS — HEART RATE: 74 BPM

## 2019-01-02 VITALS — HEART RATE: 71 BPM

## 2019-01-02 VITALS — HEART RATE: 76 BPM | SYSTOLIC BLOOD PRESSURE: 101 MMHG | DIASTOLIC BLOOD PRESSURE: 57 MMHG | RESPIRATION RATE: 18 BRPM

## 2019-01-02 VITALS — HEART RATE: 69 BPM

## 2019-01-02 VITALS — HEART RATE: 76 BPM

## 2019-01-02 RX ADMIN — METOPROLOL TARTRATE SCH MG: 25 TABLET, FILM COATED ORAL at 20:34

## 2019-01-02 RX ADMIN — METOPROLOL TARTRATE SCH MG: 25 TABLET, FILM COATED ORAL at 14:45

## 2019-01-02 RX ADMIN — ATORVASTATIN CALCIUM SCH MG: 80 TABLET, FILM COATED ORAL at 20:32

## 2019-01-02 RX ADMIN — ASPIRIN 81 MG SCH MG: 81 TABLET ORAL at 09:09

## 2019-01-02 NOTE — CONS
Date/Time of Note


Date/Time of Note


DATE: 1/2/19 


TIME: 07:50





Consult Date/Type/Reason


Admit Date/Time


Dec 31, 2018 at 22:26


Initial Consult Date





Type of Consultation:  cv





Subjective


      cv follow up note





S


D/W staff and physicians


tele was reviewed. 


pt with no more chest pain or pressure or palpitations now


no bleeding 





O:


General: no acute distress


HEENT: NC/AT. pupils are equal. round. 


NECK: NO JVD. no stridor. 


CV: RRR. systolic murmur; no gallop or rubs. 


PULM: no wheezing or rhonchi. 


GI: SOFT, NT, ND, no rebound or guarding 


Extremity: trace B/L LE edema. no clubbing. 


neuro: awake and alert, OX3. 


Psych: calm and pleasant 


rectal: deferred 








echo shows normal EF and no effusion





Objective





Vital Signs


  Date      Temp  Pulse  Resp  B/P (MAP)   Pulse Ox  O2          O2 Flow    FiO2


Time                                                 Delivery    Rate


    1/2/19  98.1     69    18      102/61        98


     07:20                           (75)


    1/1/19                                           Nasal             2.0


     20:00                                           Cannula








Intake and Output





1/1/19 1/1/19 1/2/19





1515:00


23:00


07:00





IntakeIntake Total


1070 ml





BalanceBalance


1070 ml














Results/Medications


Result Diagram:  


1/2/19 0537                                                                     


          1/2/19 0537





Results 24 hrs





Laboratory Tests


               Test
                                1/2/19
05:37


               White Blood Count                        11.0  #H


               Red Blood Count                           3.77  L


               Hemoglobin                                11.1  L


               Hematocrit                                32.6  L


               Mean Corpuscular Volume                    86.5


               Mean Corpuscular Hemoglobin                29.4


               Mean Corpuscular Hemoglobin
Concent       34.0  



               Red Cell Distribution Width                12.9


               Platelet Count                              254


               Mean Platelet Volume                      11.7  H


               Immature Granulocytes %                  0.500  H


               Neutrophils %                              72.1


               Lymphocytes %                              19.4


               Monocytes %                                 7.4


               Eosinophils %                               0.2


               Basophils %                                 0.4


               Nucleated Red Blood Cells %                 0.0


               Immature Granulocytes #                  0.060  H


               Neutrophils #                              7.9  H


               Lymphocytes #                               2.1


               Monocytes #                                 0.8


               Eosinophils #                               0.0


               Basophils #                                 0.0


               Nucleated Red Blood Cells #                 0.0


               Sodium Level                                143


               Potassium Level                             3.7


               Chloride Level                              103


               Carbon Dioxide Level                         30


               Anion Gap                                    10


               Blood Urea Nitrogen                          11


               Creatinine                                 0.56


               Est Glomerular Filtrat Rate
mL/min   > 60  



               Glucose Level                              110  #


               Calcium Level                               8.9


               Phosphorus Level                            3.5


               Magnesium Level                             1.8





Medications





Current Medications


IV Flush (NS 3 ml) 3 ml PER PROTOCOL IV ;  Start 1/1/19 at 00:00


Ondansetron HCl (Zofran Inj) 4 mg Q6H  PRN IV NAUSEA AND/OR VOMITING;  Start 


1/1/19 at 00:00


Aspirin (Aspirin) 81 mg DAILY PO  Last administered on 1/1/19at 09:12; Admin 


Dose 81 MG;  Start 1/1/19 at 09:00


Nitroglycerin (Nitroglycerin (Sl Tab) 0.4 Mg) 1 tab Q5M  PRN SL CHEST PAIN;  


Start 1/1/19 at 00:00


Acetaminophen (Tylenol Tab) 650 mg Q6H  PRN PO PAIN LEVEL 1-3 OR FEVER;  Start 


1/1/19 at 00:00


Albuterol/ Ipratropium (Duoneb) 3 ml Q2H RESP THERAPY  PRN HHN SHORTNESS OF 


BREATH;  Start 1/1/19 at 00:00


Enoxaparin Sodium (Lovenox) 65 mg Q12 SC  Last administered on 1/1/19at 20:16; 


Admin Dose 65 MG;  Start 1/1/19 at 09:00


Atorvastatin Calcium (Lipitor) 80 mg HS PO  Last administered on 1/1/19at 20:10;


Admin Dose 80 MG;  Start 1/1/19 at 21:00


Morphine Sulfate (morphine) 2 mg Q4H  PRN IV SEVERE PAIN LEVEL 7-10;  Start 


1/1/19 at 11:30





Assessment/Plan


Chief Complaint/Hosp Course


chest pain and abnormal trop: c/w NSTEMI


chest pain 


anemia 


hypotension: 


 


Rec;


dc lovenox


cont ASA


LHC french possible PCI TODAY 


CONT LIPITOR 


too hypotensive to tolerate betablocker








Thank you





ASH PATINO,ASH HER                 Jan 2, 2019 07:54

## 2019-01-02 NOTE — PN
Date/Time of Note


Date/Time of Note


DATE: 1/2/19 


TIME: 11:18





Assessment/Plan


VTE Prophylaxis


Risk score (from Ns)>0 risk:  2


SCD applied (from Ns):  Yes


SCD contraindicated:  low risk/ambulating


Pharmacological prophylaxis:  NA/contraindicated


Pharm contraindication:  low risk/ambulating





Lines/Catheters


IV Catheter Type (from [facility]):  Saline Lock


Urinary Cath still in place:  No





Assessment/Plan


Hospital Course





SUBJECTIVE: No acute overnight episodes.  No chest pain.








OBJECTIVE: Vital signs-see below








PHYSICAL EXAM:


Constitutional: Well-developed, adequately built, lying in bed comfortably.


Psych:  nl mood/affect, no complaints


Head:  atraumatic, normocephalic


Eyes:  nl conjunctiva, nl sclera


ENMT:  mucosa pink and moist, nl external ears & nose


Neck:  non-tender, supple


Respiratory:  clear to auscultation, normal air movement


Cardiovascular:  nl pulses, regular rate and rhythm


Gastrointestinal:  non-tender, soft, bowel sounds active in all 4 quadrants.


Musculoskeletal/extremities:  nl extremities to inspection, motor strength equal


bilaterally, no focal deficit. Normal pulses,no cyanosis, no edema.


Neurological: Alert oriented 3,nl speech, nl strength


Skin:  nl turgor





ASSESSMENT/PLAN:38-year-old female with no past medical or social history, 


admitted with 2-day duration of left-sided chest pain radiating to left shoulder


, neck, and LUE, found to have elevated troponin with EKG changes.





1.  Non-ST elevated myocardial infarction.


-Plan is [facility], tentatively scheduled for tomorrow.


-Continue aspirin, high intensity statin.  No indication for ATC per cardiology.





2.  Low TSH, assess significance.


-Add a full thyroid panel.





DVT prophylaxis: SCDs


PUD prophylaxis: Not indicated


Diet: Low-cholesterol/low-fat diet.





Disposition: Continue current medical management and follow-up cardiology 


recommendations.





Patient was seen in collaboration with 


Result Diagram:  


1/2/19 0537                                                                     


          1/2/19 0537





Results 24hrs





Laboratory Tests


        Test
                                1/2/19
05:36  1/2/19
05:37


        Serum HCG, Qualitative               NEGATIVE


        White Blood Count                                      11.0  #H


        Red Blood Count                                         3.77  L


        Hemoglobin                                              11.1  L


        Hematocrit                                              32.6  L


        Mean Corpuscular Volume                                  86.5


        Mean Corpuscular Hemoglobin                              29.4


        Mean Corpuscular Hemoglobin
Concent  
                  34.0  



        Red Cell Distribution Width                              12.9


        Platelet Count                                            254


        Mean Platelet Volume                                    11.7  H


        Immature Granulocytes %                                0.500  H


        Neutrophils %                                            72.1


        Lymphocytes %                                            19.4


        Monocytes %                                               7.4


        Eosinophils %                                             0.2


        Basophils %                                               0.4


        Nucleated Red Blood Cells %                               0.0


        Immature Granulocytes #                                0.060  H


        Neutrophils #                                            7.9  H


        Lymphocytes #                                             2.1


        Monocytes #                                               0.8


        Eosinophils #                                             0.0


        Basophils #                                               0.0


        Nucleated Red Blood Cells #                               0.0


        Sodium Level                                              143


        Potassium Level                                           3.7


        Chloride Level                                            103


        Carbon Dioxide Level                                       30


        Anion Gap                                                  10


        Blood Urea Nitrogen                                        11


        Creatinine                                               0.56


        Est Glomerular Filtrat Rate
mL/min   
             > 60  



        Glucose Level                                            110  #


        Calcium Level                                             8.9


        Phosphorus Level                                          3.5


        Magnesium Level                                           1.8








Exam/Review of Systems


Vital Signs


Vitals





Vital Signs


  Date      Temp  Pulse  Resp  B/P (MAP)   Pulse Ox  O2          O2 Flow    FiO2


Time                                                 Delivery    Rate


    1/2/19           74


     08:58


    1/2/19  98.1           18      102/61        98


     07:20                           (75)


    1/1/19                                           Nasal             2.0


     20:00                                           Cannula








Intake and Output





1/1/19 1/1/19 1/2/19





1515:00


23:00


07:00





IntakeIntake Total


1070 ml





BalanceBalance


1070 ml














Medications


Medications





Current Medications


IV Flush (NS 3 ml) 3 ml PER PROTOCOL IV ;  Start 1/1/19 at 00:00


Ondansetron HCl (Zofran Inj) 4 mg Q6H  PRN IV NAUSEA AND/OR VOMITING;  Start 


1/1/19 at 00:00


Aspirin (Aspirin) 81 mg DAILY PO  Last administered on 1/2/19at 09:09; Admin 


Dose 81 MG;  Start 1/1/19 at 09:00


Nitroglycerin (Nitroglycerin (Sl Tab) 0.4 Mg) 1 tab Q5M  PRN SL CHEST PAIN;  


Start 1/1/19 at 00:00


Acetaminophen (Tylenol Tab) 650 mg Q6H  PRN PO PAIN LEVEL 1-3 OR FEVER;  Start 


1/1/19 at 00:00


Albuterol/ Ipratropium (Duoneb) 3 ml Q2H RESP THERAPY  PRN HHN SHORTNESS OF 


BREATH;  Start 1/1/19 at 00:00


Atorvastatin Calcium (Lipitor) 80 mg HS PO  Last administered on 1/1/19at 20:10;


Admin Dose 80 MG;  Start 1/1/19 at 21:00


Morphine Sulfate (morphine) 2 mg Q4H  PRN IV SEVERE PAIN LEVEL 7-10;  Start 


1/1/19 at 11:30











KENNA ROMO NP                Jan 2, 2019 11:28

## 2019-01-02 NOTE — NUR
EOSS;PT AWAKE,ALERT ORIENTED X4;HEMODYNAMICS STABLE;MONITOR INDICATE NSR;NO C/O 
PAIN/DISTRESS;HOURLY ROUNDING;CON'T POC

## 2019-01-03 VITALS — RESPIRATION RATE: 18 BRPM | SYSTOLIC BLOOD PRESSURE: 101 MMHG | HEART RATE: 57 BPM | DIASTOLIC BLOOD PRESSURE: 63 MMHG

## 2019-01-03 VITALS — HEART RATE: 59 BPM

## 2019-01-03 VITALS — DIASTOLIC BLOOD PRESSURE: 58 MMHG | SYSTOLIC BLOOD PRESSURE: 104 MMHG | HEART RATE: 69 BPM | RESPIRATION RATE: 18 BRPM

## 2019-01-03 VITALS — HEART RATE: 76 BPM | SYSTOLIC BLOOD PRESSURE: 100 MMHG | DIASTOLIC BLOOD PRESSURE: 76 MMHG | RESPIRATION RATE: 18 BRPM

## 2019-01-03 VITALS — HEART RATE: 63 BPM | SYSTOLIC BLOOD PRESSURE: 92 MMHG | RESPIRATION RATE: 18 BRPM | DIASTOLIC BLOOD PRESSURE: 60 MMHG

## 2019-01-03 VITALS — HEART RATE: 70 BPM

## 2019-01-03 VITALS — RESPIRATION RATE: 18 BRPM | SYSTOLIC BLOOD PRESSURE: 101 MMHG | DIASTOLIC BLOOD PRESSURE: 58 MMHG | HEART RATE: 63 BPM

## 2019-01-03 VITALS — DIASTOLIC BLOOD PRESSURE: 58 MMHG | HEART RATE: 64 BPM | SYSTOLIC BLOOD PRESSURE: 98 MMHG | RESPIRATION RATE: 18 BRPM

## 2019-01-03 VITALS — HEART RATE: 63 BPM

## 2019-01-03 VITALS — HEART RATE: 61 BPM

## 2019-01-03 RX ADMIN — ENOXAPARIN SODIUM SCH MG: 100 INJECTION SUBCUTANEOUS at 09:12

## 2019-01-03 RX ADMIN — ATORVASTATIN CALCIUM SCH MG: 80 TABLET, FILM COATED ORAL at 21:28

## 2019-01-03 RX ADMIN — METOPROLOL TARTRATE SCH MG: 25 TABLET, FILM COATED ORAL at 09:00

## 2019-01-03 RX ADMIN — METOPROLOL TARTRATE SCH MG: 25 TABLET, FILM COATED ORAL at 21:29

## 2019-01-03 RX ADMIN — ASPIRIN 81 MG SCH MG: 81 TABLET ORAL at 08:55

## 2019-01-03 NOTE — CONS
Date/Time of Note


Date/Time of Note


DATE: 1/3/19 


TIME: 07:43





Consult Date/Type/Reason


Admit Date/Time


Dec 31, 2018 at 22:26


Initial Consult Date





Type of Consultation:  cv





Subjective


      cv follow up note





S


D/W staff and physicians


d/w 


tele was reviewed. 


pt with no more chest pain or pressure or palpitations now


no bleeding 





O:


General: no acute distress


HEENT: NC/AT. pupils are equal. round. 


NECK: NO JVD. no stridor. 


CV: RRR. systolic murmur; no gallop or rubs. 


PULM: no wheezing or rhonchi. 


GI: SOFT, NT, ND, no rebound or guarding 


Extremity: trace B/L LE edema. no clubbing. 


neuro: awake and alert, OX3. 


Psych: calm and pleasant 


rectal: deferred 








echo shows normal EF and no effusion





Objective





Vital Signs


  Date      Temp  Pulse  Resp  B/P (MAP)   Pulse Ox  O2          O2 Flow    FiO2


Time                                                 Delivery    Rate


    1/3/19  98.5     64    18  98/58 (71)        96


     07:25


    1/3/19                                           Room Air


     04:00


    1/2/19                                                             2.0


     20:30








Intake and Output





1/2/19


1/2/19


1/3/19





1515:00


23:00


07:00





IntakeIntake Total


900 ml





BalanceBalance


900 ml














Results/Medications


Result Diagram:  


1/3/19 0555                                                                     


          1/3/19 0555





Results 24 hrs





Laboratory Tests


               Test
                                1/3/19
05:55


               White Blood Count                          7.4  #


               Red Blood Count                           3.92  L


               Hemoglobin                                11.7  L


               Hematocrit                                34.3  L


               Mean Corpuscular Volume                    87.5


               Mean Corpuscular Hemoglobin                29.8


               Mean Corpuscular Hemoglobin
Concent       34.1  



               Red Cell Distribution Width                12.8


               Platelet Count                              268


               Mean Platelet Volume                      11.2  H


               Immature Granulocytes %                  0.800  H


               Neutrophils %


               Segmented Neutrophils %
(Manual)            60  



               Band Neutrophils % (Manual)                   1


               Lymphocytes %


               Lymphocytes % (Manual)                       26


               Reactive Lymphocytes %
(Manual)             4  H



               Monocytes %


               Monocytes % (Manual)                          7


               Eosinophils %


               Eosinophils % (Manual)                        1


               Basophils %


               Myelocytes % (Manual)                        1  H


               Nucleated Red Blood Cells %                 0.0


               Immature Granulocytes #                  0.060  H


               Neutrophils #


               Neutrophils # (Manual)                      4.4


               Band Neutrophils #                          0.0


               Lymphocytes (Manual)                        1.9


               Lymphocytes #


               Reactive Lymphocytes #                     0.2  H


               Monocytes #


               Monocytes # (Manual)                        0.5


               Eosinophils #


               Basophils #


               Myelocytes #                                0.0


               Nucleated Red Blood Cells #


               Platelet Estimate                    NORMAL


               Giant Platelets                              4  H


               Polychromasia                                2+


               Anisocytosis                                 2+


               Microcytosis                                 2+


               Sodium Level                                144


               Potassium Level                             3.8


               Chloride Level                              104


               Carbon Dioxide Level                         30


               Anion Gap                                    10


               Blood Urea Nitrogen                          13


               Creatinine                                 0.59


               Est Glomerular Filtrat Rate
mL/min   > 60  



               Glucose Level                               104


               Calcium Level                               8.9


               Troponin I                              3.240  *H





Medications





Current Medications


IV Flush (NS 3 ml) 3 ml PER PROTOCOL IV ;  Start 1/1/19 at 00:00


Ondansetron HCl (Zofran Inj) 4 mg Q6H  PRN IV NAUSEA AND/OR VOMITING;  Start 


1/1/19 at 00:00


Aspirin (Aspirin) 81 mg DAILY PO  Last administered on 1/2/19at 09:09; Admin 


Dose 81 MG;  Start 1/1/19 at 09:00


Nitroglycerin (Nitroglycerin (Sl Tab) 0.4 Mg) 1 tab Q5M  PRN SL CHEST PAIN;  


Start 1/1/19 at 00:00


Acetaminophen (Tylenol Tab) 650 mg Q6H  PRN PO PAIN LEVEL 1-3 OR FEVER;  Start 


1/1/19 at 00:00


Albuterol/ Ipratropium (Duoneb) 3 ml Q2H RESP THERAPY  PRN HHN SHORTNESS OF 


BREATH;  Start 1/1/19 at 00:00


Atorvastatin Calcium (Lipitor) 80 mg HS PO  Last administered on 1/2/19at 20:32;


Admin Dose 80 MG;  Start 1/1/19 at 21:00


Morphine Sulfate (morphine) 2 mg Q4H  PRN IV SEVERE PAIN LEVEL 7-10;  Start 


1/1/19 at 11:30


Metoprolol Tartrate (Lopressor) 25 mg BID PO  Last administered on 1/2/19at 


20:34; Admin Dose 25 MG;  Start 1/2/19 at 14:30





Assessment/Plan


Chief Complaint/Hosp Course


chest pain and abnormal trop: c/w NSTEMI: but pt with no major risk factors. 


chest pain 


anemia 


hypotension: 


 


Rec;


off lovenox


cont ASA


cath lab is not working.  planned to do CT french angio but I was told it was not 


working either.  will need to do french angio or at least CT french angio to define 


french anatomy, 


CONT LIPITOR 


 betablocker if BP allows 








Thank you





ASH COLES MD, MD                 Marbin 3, 2019 07:45

## 2019-01-03 NOTE — PN
Date/Time of Note


Date/Time of Note


DATE: 1/3/19 


TIME: 13:43





Assessment/Plan


VTE Prophylaxis


Risk score (from Nsg)>0 risk:  1


SCD applied (from Nsg):  Yes


Pharmacological prophylaxis:  LMWH





Lines/Catheters


IV Catheter Type (from Nrsg):  Saline Lock


Urinary Cath still in place:  No





Assessment/Plan


Hospital Course





SUBJECTIVE: No acute overnight episodes.  No chest pain.








OBJECTIVE: Vital signs-see below








PHYSICAL EXAM:


Constitutional: Well-developed, adequately built, lying in bed comfortably.


Psych:  nl mood/affect, no complaints


Head:  atraumatic, normocephalic


Eyes:  nl conjunctiva, nl sclera


ENMT:  mucosa pink and moist, nl external ears & nose


Neck:  non-tender, supple


Respiratory:  clear to auscultation, normal air movement


Cardiovascular:  nl pulses, regular rate and rhythm


Gastrointestinal:  non-tender, soft, bowel sounds active in all 4 quadrants.


Musculoskeletal/extremities:  nl extremities to inspection, motor strength equal


bilaterally, no focal deficit. Normal pulses,no cyanosis, no edema.


Neurological: Alert oriented 3,nl speech, nl strength


Skin:  nl turgor





ASSESSMENT/PLAN:38-year-old female with no past medical or social history, 


admitted with 2-day duration of left-sided chest pain radiating to left 


shoulder, neck, and LUE, found to have elevated troponin with EKG changes.





1.  Non-ST elevated myocardial infarction.


-Plan is CT coronary angiography versus C-timing /scheduling per cardiology


-Continue aspirin, high intensity statin.  No indication for ATC per cardiology.








DVT prophylaxis: Lovenox subcutaneous


PUD prophylaxis: Not indicated


Diet: Low-cholesterol/low-fat diet.





Disposition: Continue current medical management and follow-up cardiology 


recommendations.





Patient was seen in collaboration with 


Result Diagram:  


1/3/19 0555                                                                     


          1/3/19 0555





Results 24hrs





Laboratory Tests


               Test
                                1/3/19
05:55


               White Blood Count                          7.4  #


               Red Blood Count                           3.92  L


               Hemoglobin                                11.7  L


               Hematocrit                                34.3  L


               Mean Corpuscular Volume                    87.5


               Mean Corpuscular Hemoglobin                29.8


               Mean Corpuscular Hemoglobin
Concent       34.1  



               Red Cell Distribution Width                12.8


               Platelet Count                              268


               Mean Platelet Volume                      11.2  H


               Immature Granulocytes %                  0.800  H


               Neutrophils %


               Segmented Neutrophils %
(Manual)            60  



               Band Neutrophils % (Manual)                   1


               Lymphocytes %


               Lymphocytes % (Manual)                       26


               Reactive Lymphocytes %
(Manual)             4  H



               Monocytes %


               Monocytes % (Manual)                          7


               Eosinophils %


               Eosinophils % (Manual)                        1


               Basophils %


               Myelocytes % (Manual)                        1  H


               Nucleated Red Blood Cells %                 0.0


               Immature Granulocytes #                  0.060  H


               Neutrophils #


               Neutrophils # (Manual)                      4.4


               Band Neutrophils #                          0.0


               Lymphocytes (Manual)                        1.9


               Lymphocytes #


               Reactive Lymphocytes #                     0.2  H


               Monocytes #


               Monocytes # (Manual)                        0.5


               Eosinophils #


               Basophils #


               Myelocytes #                                0.0


               Nucleated Red Blood Cells #


               Platelet Estimate                    NORMAL


               Giant Platelets                              4  H


               Polychromasia                                2+


               Anisocytosis                                 2+


               Microcytosis                                 2+


               Sodium Level                                144


               Potassium Level                             3.8


               Chloride Level                              104


               Carbon Dioxide Level                         30


               Anion Gap                                    10


               Blood Urea Nitrogen                          13


               Creatinine                                 0.59


               Est Glomerular Filtrat Rate
mL/min   > 60  



               Glucose Level                               104


               Calcium Level                               8.9


               Troponin I                              3.240  *H








Exam/Review of Systems


Vital Signs


Vitals





Vital Signs


  Date      Temp  Pulse  Resp  B/P (MAP)   Pulse Ox  O2          O2 Flow    FiO2


Time                                                 Delivery    Rate


    1/3/19           59


     12:00


    1/3/19  98.4           18      101/58        96


     11:36                           (72)


    1/3/19                                           Room Air


     04:00


    1/2/19                                                             2.0


     20:30








Intake and Output





1/2/19


1/2/19


1/3/19





1515:00


23:00


07:00





IntakeIntake Total


900 ml





BalanceBalance


900 ml














Medications


Medications





Current Medications


IV Flush (NS 3 ml) 3 ml PER PROTOCOL IV ;  Start 1/1/19 at 00:00


Ondansetron HCl (Zofran Inj) 4 mg Q6H  PRN IV NAUSEA AND/OR VOMITING;  Start 


1/1/19 at 00:00


Aspirin (Aspirin) 81 mg DAILY PO  Last administered on 1/3/19at 08:55; Admin 


Dose 81 MG;  Start 1/1/19 at 09:00


Nitroglycerin (Nitroglycerin (Sl Tab) 0.4 Mg) 1 tab Q5M  PRN SL CHEST PAIN;  


Start 1/1/19 at 00:00


Acetaminophen (Tylenol Tab) 650 mg Q6H  PRN PO PAIN LEVEL 1-3 OR FEVER;  Start 


1/1/19 at 00:00


Albuterol/ Ipratropium (Duoneb) 3 ml Q2H RESP THERAPY  PRN HHN SHORTNESS OF 


BREATH;  Start 1/1/19 at 00:00


Atorvastatin Calcium (Lipitor) 80 mg HS PO  Last administered on 1/2/19at 20:32;


Admin Dose 80 MG;  Start 1/1/19 at 21:00


Morphine Sulfate (morphine) 2 mg Q4H  PRN IV SEVERE PAIN LEVEL 7-10;  Start 


1/1/19 at 11:30


Metoprolol Tartrate (Lopressor) 25 mg BID PO  Last administered on 1/2/19at 


20:34; Admin Dose 25 MG;  Start 1/2/19 at 14:30


Enoxaparin Sodium (Lovenox) 40 mg DAILY SC  Last administered on 1/3/19at 09:12;


Admin Dose 40 MG;  Start 1/3/19 at 09:00











KENNA ROMO NP                Marbin 3, 2019 13:45

## 2019-01-03 NOTE — RADRPT
Vent Rate: 71 bpm

RR Interval: 0 msec

MS Interval: 140 msec

QRS Duration: 78 msec

QT Interval: 394 msec

QTC Interval: 428 msec

P-R-T Axis: 74 - 81 - 34 degrees

 

 Normal sinus rhythm

 Septal infarct , age undetermined

 Abnormal ECG

 

Electronically Signed By: Jaswant Witt

[id number]

## 2019-01-03 NOTE — NUR
REGARDING CT CORONARY ANGIOGRAM. I notified AMELIA Rdz that the Injector for CT is still down 
but is currently being serviced. As soon as it is up and functioning again, we will bring 
the patient down for the exam.

## 2019-01-03 NOTE — NUR
EOSS

Patient stable, denied any c/o pain throughout the shift, vital signs stable, CT/Angio 
postponed by radiology department for this morning due to equipment failure. Plan of care 
explained to patient, report to be endorsed to oncoming shift.

## 2019-01-04 VITALS — RESPIRATION RATE: 17 BRPM | DIASTOLIC BLOOD PRESSURE: 54 MMHG | HEART RATE: 59 BPM | SYSTOLIC BLOOD PRESSURE: 97 MMHG

## 2019-01-04 VITALS — HEART RATE: 60 BPM | SYSTOLIC BLOOD PRESSURE: 107 MMHG | RESPIRATION RATE: 17 BRPM | DIASTOLIC BLOOD PRESSURE: 64 MMHG

## 2019-01-04 VITALS — RESPIRATION RATE: 16 BRPM | SYSTOLIC BLOOD PRESSURE: 98 MMHG | HEART RATE: 75 BPM | DIASTOLIC BLOOD PRESSURE: 57 MMHG

## 2019-01-04 VITALS — DIASTOLIC BLOOD PRESSURE: 59 MMHG | RESPIRATION RATE: 18 BRPM | HEART RATE: 71 BPM | SYSTOLIC BLOOD PRESSURE: 98 MMHG

## 2019-01-04 VITALS — RESPIRATION RATE: 17 BRPM | HEART RATE: 58 BPM | DIASTOLIC BLOOD PRESSURE: 50 MMHG | SYSTOLIC BLOOD PRESSURE: 91 MMHG

## 2019-01-04 VITALS — HEART RATE: 59 BPM

## 2019-01-04 VITALS — HEART RATE: 66 BPM

## 2019-01-04 RX ADMIN — ENOXAPARIN SODIUM SCH MG: 100 INJECTION SUBCUTANEOUS at 08:56

## 2019-01-04 RX ADMIN — METOPROLOL TARTRATE SCH MG: 25 TABLET, FILM COATED ORAL at 08:51

## 2019-01-04 RX ADMIN — ASPIRIN 81 MG SCH MG: 81 TABLET ORAL at 08:51

## 2019-01-04 NOTE — DS
Date/Time of Note


Date/Time of Note


DATE: 1/4/19 


TIME: 14:04





Discharge Summary


Admission/Discharge Info


Admit Date/Time


Dec 31, 2018 at 22:26


Discharge Date/Time





Discharge Diagnosis





1.  Non-ST elevated myocardial infarction.Status post CT angiography=> Negative 


coronaries


Patient Condition:  Stable


Procedures


1/3/2018.  Cardiac CT.


IMPRESSION:


Total calcium score: 0


 


RCA: Normal.


 


LM: Normal.


 


LAD: Normal.


 


LCX: Normal.


 


RPTAT: AADD


__________________________








2D echocardiogram








Conclusions


Overall, normal left ventricular systolic function. Not all segments 


visualized.  Normal left ventricular cavity size.  Normal left 


ventricular wall thickness.  Ejection fraction is visually estimated at 


60 %.


Normal right ventricular size.  Normal right ventricular systolic 


function.


The left atrium is normal in size.


The right atrium is normal in size.


No significant valvular stenosis or regurgitation seen.


Normal pericardium with no significant pericardial effusion.


 


Electronically Signed By:


Edwin Puentes


01-Jan-2019 12:11:19  -0800


 


Patient Name: KELVIN BRUCE


MRN: [id number]


Study Date: 01-Jan-2019 [id number]


Hospital Course


38-year-old female with no past medical or social history, admitted with 2-day 


duration of left-sided chest pain radiating to left shoulder, neck, and LUE, 


found to have elevated troponin.





Patient was kept in telemetry.  She had a normal echocardiogram.  She was 


continued on aggressive medical management including aspirin, high intensity 


statin and beta-blockers.  Patient had cardiology evaluation.  Patient had a CT 


coronary angiography showed normal coronaries.  Patient's chest pain resolved.  


She is completely asymptomatic.  Troponin started to trend down.  At this time, 


neurologist did not believe any further invasive cardiac intervention is 


indicated at this time, as such recommended to discharge patient on medical 


management.  I gave her prescription for high intensity statin, beta-blockers 


and aspirin.  I have instructed patient to follow-up with her primary care 


physician in 1 week and outpatient cardiology follow-up.





Approximately 60 minutes was spent on coordinating the discharge on this 


patient.





Patient was seen in collaboration with Dr. Jolly.


Home Meds


Active Scripts


Aspirin (Aspirin) 81 Mg Chew, 81 MG PO DAILY, #30 TAB


   Prov:KENNA ROMO NP         1/4/19


Metoprolol Tartrate* (Lopressor*) 25 Mg Tab, 25 MG PO BID, #60 TAB


   Prov:ROMOLUZ MARCELINOA V. NP         1/4/19


Atorvastatin* (Atorvastatin*) 80 Mg Tablet, 80 MG PO HS, #30 TAB


   Prov:ROMOXOCHILTKENNA V. NP         1/4/19


Reported Medications


Multivitamins* (Theragran*) 1 Tab Tab, 1 TAB PO DAILY, TAB


   12/31/18


Ibuprofen* (Ibuprofen*) 200 Mg Capsule, 200 MG PO QID PRN for PAIN, CAP


   12/31/18


Follow-up Plan


Follow-up with primary care physician in 1 week.  Recommend cardiology follow-up


Primary Care Provider


Care Physician No Primary


Pending Labs





Laboratory Tests


         Test
                                             1/4/19
06:15


         White Blood Count
                     10.1 10^3/ul
(4.8-10.8)


         Red Blood Count
                      4.04 10^6/ul
(4.20-5.40)


         Hemoglobin
                              11.9 g/dl
(12.0-16.0)


         Hematocrit
                                 34.8 %
(37.0-47.0)


         Mean Corpuscular Volume
                  86.1 fl
(82.0-101.0)


         Mean Corpuscular Hemoglobin
               29.5 pg
(29.0-33.0)


         Mean Corpuscular Hemoglobin
Concent      34.2 g/dl
(32.0-37.0)


         Red Cell Distribution Width
                12.4 %
(11.5-14.5)


         Platelet Count
                          303 10^3/UL
(140-415)


         Mean Platelet Volume
                       11.2 fl
(7.4-10.4)


         Immature Granulocytes %
                 0.800 %
(0.001-0.429)


         Neutrophils %                         % (39.0-77.0)


         Segmented Neutrophils %
(Manual)                58 % (39-77) 



         Lymphocytes %                         % (15.0-51.0)


         Lymphocytes % (Manual)                           30 % (15-51)


         Reactive Lymphocytes %
(Manual)                    2 % (0-0) 



         Monocytes %                           % (0.0-11.0)


         Monocytes % (Manual)                               8 % (0-11)


         Eosinophils %                         % (0.0-7.0)


         Eosinophils % (Manual)                              2 % (0-7)


         Basophils %                           % (0.0-2.0)


         Nucleated Red Blood Cells %
             0.0 /100WBC
(0.0-0.0)


         Immature Granulocytes #
             0.080 10^3/ul
(0.0-0.031)


         Neutrophils #
                               10^3/ul
(1.6-7.5)


         Lymphocytes (Manual)
                    3.0 10^3/ul
(0.8-2.9)


         Lymphocytes #
                               10^3/ul
(0.8-2.9)


         Reactive Lymphocytes #
                  0.2 10^3/ul
(0.0-0.0)


         Monocytes #
                                 10^3/ul
(0.3-0.9)


         Monocytes # (Manual)
                    0.8 10^3/ul
(0.3-0.9)


         Eosinophils #
                               10^3/ul
(0.0-0.5)


         Basophils #
                                 10^3/ul
(0.0-0.1)


         Nucleated Red Blood Cells #
                 10^3/ul
(0.0-0.0)


         Platelet Estimate                    NORMAL


         Polychromasia                                        1+ (0-0)


         Anisocytosis                                         2+ (0-0)


         Microcytosis                                         2+ (0-0)


         Sodium Level
                             139 mmol/L
(135-144)


         Potassium Level
                          3.9 mmol/L
(3.5-5.1)


         Chloride Level
                            105 mmol/L
()


         Carbon Dioxide Level
                        28 mmol/L
(21-31)


         Anion Gap                                            6 (5-13)


         Blood Urea Nitrogen
                           10 mg/dl
(7-20)


         Creatinine
                             0.49 mg/dl
(0.44-1.00)


         Est Glomerular Filtrat Rate
mL/min   > 60 mL/min
(>60)


         Glucose Level
                              102 mg/dl
()


         Calcium Level
                            8.8 mg/dl
(8.4-10.2)














KENNA ROMO NP                Jan 4, 2019 14:06

## 2019-01-04 NOTE — NUR
discharge notes:



Angelica NP was here and spoke with patient and informed her that she is being discharge today, 
discharge instructions provided to her, discharge packet went over with the patient and used 
interpretative service name Bud and ID# [id number], patient verbalized understanding. spouse at 
bedside, heart monitor removed, heplock removed.

## 2019-01-04 NOTE — PDOCDIS
Discharge Instructions


CONDITION


                [id number]
Patient Condition:  [id number]
Stable








HOME CARE INSTRUCTIONS:


                [id number]
Diet Instructions:  [id number]
Regular








FOLLOW UP/APPOINTMENTS


Follow-up Plan


Follow-up with primary care physician in 1 week.  Recommend cardiology follow-up











KENNA ROMO NP                Jan 4, 2019 13:58

## 2019-01-04 NOTE — PN
Date/Time of Note


Date/Time of Note


DATE: 1/4/19 


TIME: 11:35





Assessment/Plan


VTE Prophylaxis


Risk score (from Ns)>0 risk:  2


SCD applied (from Ns):  Yes


Pharmacological prophylaxis:  NA/contraindicated


Pharm contraindication:  low risk/ambulating





Lines/Catheters


IV Catheter Type (from Nrs):  Peripheral IV


Urinary Cath still in place:  No





Assessment/Plan


Hospital Course





SUBJECTIVE: No acute overnight episodes.  No chest pain.








OBJECTIVE: Vital signs-see below








PHYSICAL EXAM:


Constitutional: Well-developed, adequately built, lying in bed comfortably.


Psych:  nl mood/affect, no complaints


Head:  atraumatic, normocephalic


Eyes:  nl conjunctiva, nl sclera


ENMT:  mucosa pink and moist, nl external ears & nose


Neck:  non-tender, supple


Respiratory:  clear to auscultation, normal air movement


Cardiovascular:  nl pulses, regular rate and rhythm


Gastrointestinal:  non-tender, soft, bowel sounds active in all 4 quadrants.


Musculoskeletal/extremities:  nl extremities to inspection, motor strength equal


bilaterally, no focal deficit. Normal pulses,no cyanosis, no edema.


Neurological: Alert oriented 3,nl speech, nl strength


Skin:  nl turgor





ASSESSMENT/PLAN:38-year-old female with no past medical or social history, 


admitted with 2-day duration of left-sided chest pain radiating to left 


shoulder, neck, and LUE, found to have elevated troponin with EKG changes.





1.  Non-ST elevated myocardial infarction.


-Status post CT angiography=> Negative


-Plan is Lexiscan stress test today with cardiology.


-Continue aspirin, high intensity statin.  No indication for ATC per cardiology.








DVT prophylaxis: Lovenox subcutaneous


PUD prophylaxis: Not indicated


Diet: Low-cholesterol/low-fat diet.





Disposition: We will up cardiology recommendations.  Discharge disposition per 


cards.





Patient was seen in collaboration with DR. Jolly


Result Diagram:  


1/4/19 0615                                                                     


          1/4/19 0615





Results 24hrs





Laboratory Tests


               Test
                                1/4/19
06:15


               White Blood Count                         10.1  #


               Red Blood Count                           4.04  L


               Hemoglobin                                11.9  L


               Hematocrit                                34.8  L


               Mean Corpuscular Volume                    86.1


               Mean Corpuscular Hemoglobin                29.5


               Mean Corpuscular Hemoglobin
Concent       34.2  



               Red Cell Distribution Width                12.4


               Platelet Count                              303


               Mean Platelet Volume                      11.2  H


               Immature Granulocytes %                  0.800  H


               Neutrophils %


               Segmented Neutrophils %
(Manual)            58  



               Lymphocytes %


               Lymphocytes % (Manual)                       30


               Reactive Lymphocytes %
(Manual)             2  H



               Monocytes %


               Monocytes % (Manual)                          8


               Eosinophils %


               Eosinophils % (Manual)                        2


               Basophils %


               Nucleated Red Blood Cells %                 0.0


               Immature Granulocytes #                  0.080  H


               Neutrophils #


               Lymphocytes (Manual)                       3.0  H


               Lymphocytes #


               Reactive Lymphocytes #                     0.2  H


               Monocytes #


               Monocytes # (Manual)                        0.8


               Eosinophils #


               Basophils #


               Nucleated Red Blood Cells #


               Platelet Estimate                    NORMAL


               Polychromasia                                1+


               Anisocytosis                                 2+


               Microcytosis                                 2+


               Sodium Level                                139


               Potassium Level                             3.9


               Chloride Level                              105


               Carbon Dioxide Level                         28


               Anion Gap                                     6


               Blood Urea Nitrogen                          10


               Creatinine                                 0.49


               Est Glomerular Filtrat Rate
mL/min   > 60  



               Glucose Level                               102


               Calcium Level                               8.8








Exam/Review of Systems


Vital Signs


Vitals





Vital Signs


  Date      Temp  Pulse  Resp  B/P (MAP)   Pulse Ox  O2          O2 Flow    FiO2


Time                                                 Delivery    Rate


    1/4/19           66


     09:03


    1/4/19  98.1           17  97/54 (68)        97


     08:01


    1/3/19                                           Room Air


     04:00


    1/2/19                                                             2.0


     20:30








Intake and Output





1/3/19


1/3/19


1/4/19





1414:59


22:59


06:59





IntakeIntake Total


800 ml


350 ml





BalanceBalance


800 ml


350 ml














Medications


Medications





Current Medications


IV Flush (NS 3 ml) 3 ml PER PROTOCOL IV ;  Start 1/1/19 at 00:00


Ondansetron HCl (Zofran Inj) 4 mg Q6H  PRN IV NAUSEA AND/OR VOMITING;  Start 


1/1/19 at 00:00


Aspirin (Aspirin) 81 mg DAILY PO  Last administered on 1/4/19at 08:51; Admin 


Dose 81 MG;  Start 1/1/19 at 09:00


Nitroglycerin (Nitroglycerin (Sl Tab) 0.4 Mg) 1 tab Q5M  PRN SL CHEST PAIN;  


Start 1/1/19 at 00:00


Acetaminophen (Tylenol Tab) 650 mg Q6H  PRN PO PAIN LEVEL 1-3 OR FEVER;  Start 


1/1/19 at 00:00


Albuterol/ Ipratropium (Duoneb) 3 ml Q2H RESP THERAPY  PRN HHN SHORTNESS OF 


BREATH;  Start 1/1/19 at 00:00


Atorvastatin Calcium (Lipitor) 80 mg HS PO  Last administered on 1/3/19at 21:28;


Admin Dose 80 MG;  Start 1/1/19 at 21:00


Morphine Sulfate (morphine) 2 mg Q4H  PRN IV SEVERE PAIN LEVEL 7-10;  Start 


1/1/19 at 11:30


Metoprolol Tartrate (Lopressor) 25 mg BID PO  Last administered on 1/4/19at 


08:51; Admin Dose 25 MG;  Start 1/2/19 at 14:30


Enoxaparin Sodium (Lovenox) 40 mg DAILY SC  Last administered on 1/4/19at 08:56;


Admin Dose 40 MG;  Start 1/3/19 at 09:00











KENNA ROMO NP                Jan 4, 2019 11:36

## 2019-10-29 NOTE — QN
Documentation


Comment


 just called me and states that patient can be discharged as a coronary


and if he is CTA is completely negative.  Troponin trended down.  Patient is 


chest pain-free.  Advised to follow-up with PCP.





case d/w .











KENNA ROMO NP                Jan 4, 2019 13:57 Name band;

## 2022-08-14 NOTE — QN
Documentation


Comment


 just called me and reported that heart cath is down and advised to 


obtain a CT coronary angiography and start patient on Lopressor 25 mg twice 


daily.











KENNA ROMO NP                Jan 2, 2019 12:57 Discharged